# Patient Record
Sex: FEMALE | Race: WHITE | NOT HISPANIC OR LATINO | Employment: FULL TIME | URBAN - METROPOLITAN AREA
[De-identification: names, ages, dates, MRNs, and addresses within clinical notes are randomized per-mention and may not be internally consistent; named-entity substitution may affect disease eponyms.]

---

## 2019-07-17 PROBLEM — E28.2 PCOS (POLYCYSTIC OVARIAN SYNDROME): Status: ACTIVE | Noted: 2019-07-17

## 2019-07-17 PROBLEM — I10 HYPERTENSION: Status: ACTIVE | Noted: 2019-07-17

## 2019-07-17 PROBLEM — F32.81 PMDD (PREMENSTRUAL DYSPHORIC DISORDER): Status: ACTIVE | Noted: 2019-07-17

## 2021-03-06 ENCOUNTER — IMMUNIZATION (OUTPATIENT)
Dept: FAMILY MEDICINE | Facility: CLINIC | Age: 41
End: 2021-03-06
Payer: COMMERCIAL

## 2021-03-06 DIAGNOSIS — Z23 NEED FOR VACCINATION: Primary | ICD-10-CM

## 2021-03-06 PROCEDURE — 91300 COVID-19, MRNA, LNP-S, PF, 30 MCG/0.3 ML DOSE VACCINE: CPT | Mod: PBBFAC | Performed by: FAMILY MEDICINE

## 2021-03-27 ENCOUNTER — IMMUNIZATION (OUTPATIENT)
Dept: FAMILY MEDICINE | Facility: CLINIC | Age: 41
End: 2021-03-27
Payer: COMMERCIAL

## 2021-03-27 DIAGNOSIS — Z23 NEED FOR VACCINATION: Primary | ICD-10-CM

## 2021-03-27 PROCEDURE — 91300 COVID-19, MRNA, LNP-S, PF, 30 MCG/0.3 ML DOSE VACCINE: ICD-10-PCS | Mod: S$GLB,,, | Performed by: FAMILY MEDICINE

## 2021-03-27 PROCEDURE — 0002A COVID-19, MRNA, LNP-S, PF, 30 MCG/0.3 ML DOSE VACCINE: ICD-10-PCS | Mod: CV19,S$GLB,, | Performed by: FAMILY MEDICINE

## 2021-03-27 PROCEDURE — 0002A COVID-19, MRNA, LNP-S, PF, 30 MCG/0.3 ML DOSE VACCINE: CPT | Mod: CV19,S$GLB,, | Performed by: FAMILY MEDICINE

## 2021-03-27 PROCEDURE — 91300 COVID-19, MRNA, LNP-S, PF, 30 MCG/0.3 ML DOSE VACCINE: CPT | Mod: S$GLB,,, | Performed by: FAMILY MEDICINE

## 2021-06-17 PROBLEM — R93.89 ABNORMAL ULTRASOUND OF THYROID GLAND: Status: ACTIVE | Noted: 2021-06-17

## 2021-07-07 ENCOUNTER — OFFICE VISIT (OUTPATIENT)
Dept: ENDOCRINOLOGY | Facility: CLINIC | Age: 41
End: 2021-07-07
Payer: COMMERCIAL

## 2021-07-07 VITALS
DIASTOLIC BLOOD PRESSURE: 76 MMHG | HEART RATE: 91 BPM | HEIGHT: 63 IN | BODY MASS INDEX: 36.14 KG/M2 | SYSTOLIC BLOOD PRESSURE: 122 MMHG | WEIGHT: 203.94 LBS | OXYGEN SATURATION: 97 %

## 2021-07-07 DIAGNOSIS — E28.2 PCOS (POLYCYSTIC OVARIAN SYNDROME): ICD-10-CM

## 2021-07-07 DIAGNOSIS — I10 ESSENTIAL HYPERTENSION: ICD-10-CM

## 2021-07-07 DIAGNOSIS — E04.2 MULTINODULAR GOITER: Primary | ICD-10-CM

## 2021-07-07 DIAGNOSIS — E66.01 CLASS 2 SEVERE OBESITY DUE TO EXCESS CALORIES WITH SERIOUS COMORBIDITY AND BODY MASS INDEX (BMI) OF 36.0 TO 36.9 IN ADULT: ICD-10-CM

## 2021-07-07 PROCEDURE — 3008F BODY MASS INDEX DOCD: CPT | Mod: CPTII,S$GLB,, | Performed by: INTERNAL MEDICINE

## 2021-07-07 PROCEDURE — 3008F PR BODY MASS INDEX (BMI) DOCUMENTED: ICD-10-PCS | Mod: CPTII,S$GLB,, | Performed by: INTERNAL MEDICINE

## 2021-07-07 PROCEDURE — 99999 PR PBB SHADOW E&M-EST. PATIENT-LVL IV: ICD-10-PCS | Mod: PBBFAC,,, | Performed by: INTERNAL MEDICINE

## 2021-07-07 PROCEDURE — 1126F PR PAIN SEVERITY QUANTIFIED, NO PAIN PRESENT: ICD-10-PCS | Mod: S$GLB,,, | Performed by: INTERNAL MEDICINE

## 2021-07-07 PROCEDURE — 99204 OFFICE O/P NEW MOD 45 MIN: CPT | Mod: S$GLB,,, | Performed by: INTERNAL MEDICINE

## 2021-07-07 PROCEDURE — 99204 PR OFFICE/OUTPT VISIT, NEW, LEVL IV, 45-59 MIN: ICD-10-PCS | Mod: S$GLB,,, | Performed by: INTERNAL MEDICINE

## 2021-07-07 PROCEDURE — 99999 PR PBB SHADOW E&M-EST. PATIENT-LVL IV: CPT | Mod: PBBFAC,,, | Performed by: INTERNAL MEDICINE

## 2021-07-07 PROCEDURE — 1126F AMNT PAIN NOTED NONE PRSNT: CPT | Mod: S$GLB,,, | Performed by: INTERNAL MEDICINE

## 2021-07-29 ENCOUNTER — PATIENT MESSAGE (OUTPATIENT)
Dept: RHEUMATOLOGY | Facility: CLINIC | Age: 41
End: 2021-07-29

## 2021-08-02 ENCOUNTER — OFFICE VISIT (OUTPATIENT)
Dept: RHEUMATOLOGY | Facility: CLINIC | Age: 41
End: 2021-08-02
Payer: COMMERCIAL

## 2021-08-02 ENCOUNTER — HOSPITAL ENCOUNTER (OUTPATIENT)
Dept: RADIOLOGY | Facility: HOSPITAL | Age: 41
Discharge: HOME | End: 2021-08-02
Attending: INTERNAL MEDICINE
Payer: COMMERCIAL

## 2021-08-02 VITALS
WEIGHT: 208.13 LBS | DIASTOLIC BLOOD PRESSURE: 90 MMHG | HEART RATE: 98 BPM | HEIGHT: 63 IN | SYSTOLIC BLOOD PRESSURE: 129 MMHG | BODY MASS INDEX: 36.88 KG/M2

## 2021-08-02 DIAGNOSIS — M13.0 POLYARTHRITIS: Primary | ICD-10-CM

## 2021-08-02 DIAGNOSIS — M13.0 POLYARTHRITIS: ICD-10-CM

## 2021-08-02 PROCEDURE — 99999 PR PBB SHADOW E&M-EST. PATIENT-LVL IV: CPT | Mod: PBBFAC,,, | Performed by: INTERNAL MEDICINE

## 2021-08-02 PROCEDURE — 73630 X-RAY EXAM OF FOOT: CPT | Mod: 26,,, | Performed by: RADIOLOGY

## 2021-08-02 PROCEDURE — 1159F MED LIST DOCD IN RCRD: CPT | Mod: CPTII,S$GLB,, | Performed by: INTERNAL MEDICINE

## 2021-08-02 PROCEDURE — 3074F SYST BP LT 130 MM HG: CPT | Mod: CPTII,S$GLB,, | Performed by: INTERNAL MEDICINE

## 2021-08-02 PROCEDURE — 3074F PR MOST RECENT SYSTOLIC BLOOD PRESSURE < 130 MM HG: ICD-10-PCS | Mod: CPTII,S$GLB,, | Performed by: INTERNAL MEDICINE

## 2021-08-02 PROCEDURE — 73630 X-RAY EXAM OF FOOT: CPT | Mod: TC,50,FY,PO

## 2021-08-02 PROCEDURE — 3080F DIAST BP >= 90 MM HG: CPT | Mod: CPTII,S$GLB,, | Performed by: INTERNAL MEDICINE

## 2021-08-02 PROCEDURE — 3080F PR MOST RECENT DIASTOLIC BLOOD PRESSURE >= 90 MM HG: ICD-10-PCS | Mod: CPTII,S$GLB,, | Performed by: INTERNAL MEDICINE

## 2021-08-02 PROCEDURE — 73630 XR FOOT COMPLETE 3 VIEW BILATERAL: ICD-10-PCS | Mod: 26,,, | Performed by: RADIOLOGY

## 2021-08-02 PROCEDURE — 3008F BODY MASS INDEX DOCD: CPT | Mod: CPTII,S$GLB,, | Performed by: INTERNAL MEDICINE

## 2021-08-02 PROCEDURE — 3008F PR BODY MASS INDEX (BMI) DOCUMENTED: ICD-10-PCS | Mod: CPTII,S$GLB,, | Performed by: INTERNAL MEDICINE

## 2021-08-02 PROCEDURE — 99205 PR OFFICE/OUTPT VISIT, NEW, LEVL V, 60-74 MIN: ICD-10-PCS | Mod: S$GLB,,, | Performed by: INTERNAL MEDICINE

## 2021-08-02 PROCEDURE — 99999 PR PBB SHADOW E&M-EST. PATIENT-LVL IV: ICD-10-PCS | Mod: PBBFAC,,, | Performed by: INTERNAL MEDICINE

## 2021-08-02 PROCEDURE — 1160F RVW MEDS BY RX/DR IN RCRD: CPT | Mod: CPTII,S$GLB,, | Performed by: INTERNAL MEDICINE

## 2021-08-02 PROCEDURE — 99205 OFFICE O/P NEW HI 60 MIN: CPT | Mod: S$GLB,,, | Performed by: INTERNAL MEDICINE

## 2021-08-02 PROCEDURE — 3044F HG A1C LEVEL LT 7.0%: CPT | Mod: CPTII,S$GLB,, | Performed by: INTERNAL MEDICINE

## 2021-08-02 PROCEDURE — 3044F PR MOST RECENT HEMOGLOBIN A1C LEVEL <7.0%: ICD-10-PCS | Mod: CPTII,S$GLB,, | Performed by: INTERNAL MEDICINE

## 2021-08-02 PROCEDURE — 1159F PR MEDICATION LIST DOCUMENTED IN MEDICAL RECORD: ICD-10-PCS | Mod: CPTII,S$GLB,, | Performed by: INTERNAL MEDICINE

## 2021-08-02 PROCEDURE — 1160F PR REVIEW ALL MEDS BY PRESCRIBER/CLIN PHARMACIST DOCUMENTED: ICD-10-PCS | Mod: CPTII,S$GLB,, | Performed by: INTERNAL MEDICINE

## 2021-08-02 RX ORDER — COLCHICINE 0.6 MG/1
TABLET ORAL
COMMUNITY
Start: 2021-07-24 | End: 2023-01-25 | Stop reason: ALTCHOICE

## 2021-08-02 RX ORDER — INDOMETHACIN 50 MG/1
50 CAPSULE ORAL 3 TIMES DAILY PRN
COMMUNITY
Start: 2021-07-24 | End: 2023-01-25 | Stop reason: ALTCHOICE

## 2021-08-03 ENCOUNTER — TELEPHONE (OUTPATIENT)
Dept: RHEUMATOLOGY | Facility: CLINIC | Age: 41
End: 2021-08-03

## 2021-11-29 ENCOUNTER — TELEPHONE (OUTPATIENT)
Dept: RHEUMATOLOGY | Facility: CLINIC | Age: 41
End: 2021-11-29

## 2021-11-29 DIAGNOSIS — M13.0 POLYARTHRITIS: ICD-10-CM

## 2021-11-29 DIAGNOSIS — M77.51 ADHESIVE CAPSULITIS OF ANKLE, RIGHT: Primary | ICD-10-CM

## 2021-11-29 DIAGNOSIS — M75.01 ADHESIVE CAPSULITIS OF RIGHT SHOULDER: ICD-10-CM

## 2021-11-30 ENCOUNTER — OFFICE VISIT (OUTPATIENT)
Dept: RHEUMATOLOGY | Facility: CLINIC | Age: 41
End: 2021-11-30
Payer: COMMERCIAL

## 2021-11-30 VITALS
HEART RATE: 96 BPM | HEIGHT: 63 IN | WEIGHT: 210.75 LBS | SYSTOLIC BLOOD PRESSURE: 108 MMHG | BODY MASS INDEX: 37.34 KG/M2 | DIASTOLIC BLOOD PRESSURE: 78 MMHG

## 2021-11-30 DIAGNOSIS — M75.31 CALCIFIC TENDONITIS OF RIGHT SHOULDER: Primary | ICD-10-CM

## 2021-11-30 PROCEDURE — 3044F HG A1C LEVEL LT 7.0%: CPT | Mod: CPTII,S$GLB,, | Performed by: INTERNAL MEDICINE

## 2021-11-30 PROCEDURE — 99999 PR PBB SHADOW E&M-EST. PATIENT-LVL IV: ICD-10-PCS | Mod: PBBFAC,,, | Performed by: INTERNAL MEDICINE

## 2021-11-30 PROCEDURE — 3074F PR MOST RECENT SYSTOLIC BLOOD PRESSURE < 130 MM HG: ICD-10-PCS | Mod: CPTII,S$GLB,, | Performed by: INTERNAL MEDICINE

## 2021-11-30 PROCEDURE — 1159F PR MEDICATION LIST DOCUMENTED IN MEDICAL RECORD: ICD-10-PCS | Mod: CPTII,S$GLB,, | Performed by: INTERNAL MEDICINE

## 2021-11-30 PROCEDURE — 3008F BODY MASS INDEX DOCD: CPT | Mod: CPTII,S$GLB,, | Performed by: INTERNAL MEDICINE

## 2021-11-30 PROCEDURE — 3078F DIAST BP <80 MM HG: CPT | Mod: CPTII,S$GLB,, | Performed by: INTERNAL MEDICINE

## 2021-11-30 PROCEDURE — 3074F SYST BP LT 130 MM HG: CPT | Mod: CPTII,S$GLB,, | Performed by: INTERNAL MEDICINE

## 2021-11-30 PROCEDURE — 20610 LARGE JOINT ASPIRATION/INJECTION: R SUBACROMIAL BURSA: ICD-10-PCS | Mod: RT,S$GLB,, | Performed by: INTERNAL MEDICINE

## 2021-11-30 PROCEDURE — 3078F PR MOST RECENT DIASTOLIC BLOOD PRESSURE < 80 MM HG: ICD-10-PCS | Mod: CPTII,S$GLB,, | Performed by: INTERNAL MEDICINE

## 2021-11-30 PROCEDURE — 99999 PR PBB SHADOW E&M-EST. PATIENT-LVL IV: CPT | Mod: PBBFAC,,, | Performed by: INTERNAL MEDICINE

## 2021-11-30 PROCEDURE — 20610 DRAIN/INJ JOINT/BURSA W/O US: CPT | Mod: RT,S$GLB,, | Performed by: INTERNAL MEDICINE

## 2021-11-30 PROCEDURE — 3044F PR MOST RECENT HEMOGLOBIN A1C LEVEL <7.0%: ICD-10-PCS | Mod: CPTII,S$GLB,, | Performed by: INTERNAL MEDICINE

## 2021-11-30 PROCEDURE — 3008F PR BODY MASS INDEX (BMI) DOCUMENTED: ICD-10-PCS | Mod: CPTII,S$GLB,, | Performed by: INTERNAL MEDICINE

## 2021-11-30 PROCEDURE — 99213 OFFICE O/P EST LOW 20 MIN: CPT | Mod: 25,S$GLB,, | Performed by: INTERNAL MEDICINE

## 2021-11-30 PROCEDURE — 99213 PR OFFICE/OUTPT VISIT, EST, LEVL III, 20-29 MIN: ICD-10-PCS | Mod: 25,S$GLB,, | Performed by: INTERNAL MEDICINE

## 2021-11-30 PROCEDURE — 1159F MED LIST DOCD IN RCRD: CPT | Mod: CPTII,S$GLB,, | Performed by: INTERNAL MEDICINE

## 2021-11-30 RX ORDER — METHYLPREDNISOLONE 4 MG/1
TABLET ORAL
Qty: 1 EACH | Refills: 0 | Status: SHIPPED | OUTPATIENT
Start: 2021-11-30 | End: 2022-01-17

## 2021-11-30 RX ORDER — DICLOFENAC SODIUM 75 MG/1
75 TABLET, DELAYED RELEASE ORAL 2 TIMES DAILY
Qty: 60 TABLET | Refills: 0 | Status: SHIPPED | OUTPATIENT
Start: 2021-11-30 | End: 2021-12-30

## 2021-11-30 RX ADMIN — METHYLPREDNISOLONE ACETATE 40 MG: 40 INJECTION, SUSPENSION INTRA-ARTICULAR; INTRALESIONAL; INTRAMUSCULAR; SOFT TISSUE at 03:11

## 2021-11-30 ASSESSMENT — ROUTINE ASSESSMENT OF PATIENT INDEX DATA (RAPID3)
PSYCHOLOGICAL DISTRESS SCORE: 0
PATIENT GLOBAL ASSESSMENT SCORE: 5
PAIN SCORE: 8
TOTAL RAPID3 SCORE: 6.22
FATIGUE SCORE: 2.2
MDHAQ FUNCTION SCORE: 1.7

## 2021-12-31 RX ORDER — METHYLPREDNISOLONE ACETATE 40 MG/ML
40 INJECTION, SUSPENSION INTRA-ARTICULAR; INTRALESIONAL; INTRAMUSCULAR; SOFT TISSUE
Status: DISCONTINUED | OUTPATIENT
Start: 2021-11-30 | End: 2021-12-31 | Stop reason: HOSPADM

## 2022-03-10 ENCOUNTER — PATIENT MESSAGE (OUTPATIENT)
Dept: RHEUMATOLOGY | Facility: CLINIC | Age: 42
End: 2022-03-10
Payer: COMMERCIAL

## 2022-03-31 ENCOUNTER — OFFICE VISIT (OUTPATIENT)
Dept: RHEUMATOLOGY | Facility: CLINIC | Age: 42
End: 2022-03-31
Payer: COMMERCIAL

## 2022-03-31 VITALS
DIASTOLIC BLOOD PRESSURE: 83 MMHG | WEIGHT: 207.31 LBS | HEART RATE: 90 BPM | HEIGHT: 63 IN | BODY MASS INDEX: 36.73 KG/M2 | SYSTOLIC BLOOD PRESSURE: 116 MMHG

## 2022-03-31 DIAGNOSIS — M15.3 OTHER SECONDARY OSTEOARTHRITIS OF MULTIPLE SITES: Primary | ICD-10-CM

## 2022-03-31 PROCEDURE — 99213 PR OFFICE/OUTPT VISIT, EST, LEVL III, 20-29 MIN: ICD-10-PCS | Mod: S$GLB,,, | Performed by: INTERNAL MEDICINE

## 2022-03-31 PROCEDURE — 99999 PR PBB SHADOW E&M-EST. PATIENT-LVL III: ICD-10-PCS | Mod: PBBFAC,,, | Performed by: INTERNAL MEDICINE

## 2022-03-31 PROCEDURE — 3008F PR BODY MASS INDEX (BMI) DOCUMENTED: ICD-10-PCS | Mod: CPTII,S$GLB,, | Performed by: INTERNAL MEDICINE

## 2022-03-31 PROCEDURE — 1159F MED LIST DOCD IN RCRD: CPT | Mod: CPTII,S$GLB,, | Performed by: INTERNAL MEDICINE

## 2022-03-31 PROCEDURE — 99213 OFFICE O/P EST LOW 20 MIN: CPT | Mod: S$GLB,,, | Performed by: INTERNAL MEDICINE

## 2022-03-31 PROCEDURE — 3008F BODY MASS INDEX DOCD: CPT | Mod: CPTII,S$GLB,, | Performed by: INTERNAL MEDICINE

## 2022-03-31 PROCEDURE — 1159F PR MEDICATION LIST DOCUMENTED IN MEDICAL RECORD: ICD-10-PCS | Mod: CPTII,S$GLB,, | Performed by: INTERNAL MEDICINE

## 2022-03-31 PROCEDURE — 99999 PR PBB SHADOW E&M-EST. PATIENT-LVL III: CPT | Mod: PBBFAC,,, | Performed by: INTERNAL MEDICINE

## 2022-03-31 PROCEDURE — 3079F DIAST BP 80-89 MM HG: CPT | Mod: CPTII,S$GLB,, | Performed by: INTERNAL MEDICINE

## 2022-03-31 PROCEDURE — 3079F PR MOST RECENT DIASTOLIC BLOOD PRESSURE 80-89 MM HG: ICD-10-PCS | Mod: CPTII,S$GLB,, | Performed by: INTERNAL MEDICINE

## 2022-03-31 PROCEDURE — 3074F PR MOST RECENT SYSTOLIC BLOOD PRESSURE < 130 MM HG: ICD-10-PCS | Mod: CPTII,S$GLB,, | Performed by: INTERNAL MEDICINE

## 2022-03-31 PROCEDURE — 3074F SYST BP LT 130 MM HG: CPT | Mod: CPTII,S$GLB,, | Performed by: INTERNAL MEDICINE

## 2022-03-31 NOTE — PROGRESS NOTES
Answers for HPI/ROS submitted by the patient on 3/28/2022  fever: No  eye redness: No  mouth sores: Yes  headaches: Yes  shortness of breath: No  chest pain: No  trouble swallowing: Yes  diarrhea: No  constipation: No  unexpected weight change: No  genital sore: No  dysuria: No  During the last 3 days, have you had a skin rash?: No  Bruises or bleeds easily: No  cough: No      Subjective:          Chief Complaint: Yumiko Montes is a 41 y.o. female who had concerns including Disease Management.    HPI:  Interval events:  Received a call from patient acute onset 24 hours ago right shoulder significant stiffness loss of range of motion no known trauma she had been on vacation during Thanksgiving without any heavy lifting or heavy exertion.    Imaging showed calcific tendon changes with  preserved glenohumeral joint some mild AC joint arthropathy.   Patient tried nabumetone with no benefit start her on Indocin 50 mg at 3:00 p.m. and again at bedtime last night with Tylenol 500 mg each dosing.    Rheum Hx:   Patient is a 40-year-old female with acute onset of possible gouty arthritis with a known strong family history of rheumatoid arthritis both in her mother and her paternal grandmother.  Patient with acute onset right knee effusion 2 weeks ago, received injection with Radu. 2-3 days later with acute onset intense right ankle/foot swelling. Would not tolerate weight bearing.   Seen in urgent care given colchicine and indomethacin she did somewhat improve over few days.   Patient is on HCTZ and spironolactone, but also on has mirena and or ortho tri-cyclin for PCOS and stable.   No hx of renal stones.     Did see JAYLA Campos-- uric acid 5.7t.   Patient denies daily arthritis. Did have flare of left wrist w/o injury spontaneously resolved 1 week with   he did have  right hip labral tear- known injury.   Patient does note knees and feet with stiffness in AM about 1 hour and improves with activity.   Hands with  extended  Fine motor.     Dry mouth,     REVIEW OF SYSTEMS:    Review of Systems   Constitutional: Negative for fever.   Eyes: Negative for redness.   Respiratory: Negative for cough and shortness of breath.    Cardiovascular: Negative for chest pain.   Gastrointestinal: Positive for diarrhea. Negative for constipation.   Genitourinary: Negative for dysuria.   Skin: Negative for rash.   Neurological: Negative for headaches.   Endo/Heme/Allergies: Does not bruise/bleed easily.               Objective:            Past Medical History:   Diagnosis Date    Abnormal ultrasound of thyroid gland 06/17/2021    Referred to Dr Banegas. Recommended Follow up in 6/2022     History of chicken pox     PCOS (polycystic ovarian syndrome)      Family History   Problem Relation Age of Onset    Other Mother         heart murmur, gastric sleeve surgery     Hypertension Father     Thyroid disease Father         has had thyroidectomy     Dupuytren's contracture Father     Heart disease Maternal Uncle     Hypertension Maternal Grandmother     Glaucoma Maternal Grandmother     Cancer Maternal Grandfather     Heart disease Maternal Grandfather     Heart disease Paternal Grandfather     Heart disease Maternal Uncle      Social History     Tobacco Use    Smoking status: Never Smoker    Smokeless tobacco: Never Used   Substance Use Topics    Alcohol use: Yes     Comment: 2-3 times a week     Drug use: Never         Current Outpatient Medications on File Prior to Visit   Medication Sig Dispense Refill    buPROPion (WELLBUTRIN XL) 150 MG TB24 tablet Take 1 tablet by mouth once daily.      colchicine (COLCRYS) 0.6 mg tablet Take by mouth. Take 2 tablets at first sign of gout attack and every hour prn      indomethacin (INDOCIN) 50 MG capsule Take 50 mg by mouth 3 (three) times daily as needed.      INTRAUTERINE DEVICE, IUD, IU by Intrauterine route.      losartan-hydrochlorothiazide 100-25 mg (HYZAAR) 100-25 mg per tablet  TAKE ONE TABLET BY MOUTH DAILY 90 tablet 1    metFORMIN (GLUCOPHAGE) 500 MG tablet Take 1 tablet (500 mg total) by mouth 2 (two) times daily with meals. 180 tablet 3    norgestimate-ethinyl estradiol (ORTHO TRI-CYCLEN,TRI-SPRINTEC) 0.18/0.215/0.25 mg-35 mcg (28) tablet Take 1 tablet by mouth once daily.      omeprazole (PRILOSEC) 20 MG capsule Take 20 mg by mouth once daily.      spironolactone (ALDACTONE) 100 MG tablet Take 1 tablet by mouth once daily.       No current facility-administered medications on file prior to visit.       Vitals:    03/31/22 1532   BP: 116/83   Pulse: 90       Physical Exam:    Physical Exam          Assessment:       No diagnosis found.       Plan:        There are no diagnoses linked to this encounter.patient with imaging and pain consistent with acute flare calcific tendonitis:    Right shoulder injection today   PT if not improved after injection   voltaren and MEdrol as above.   No follow-ups on file.      20min consultation with greater than 50% spent in counseling, chart review and coordination of care. All questions answered.  Thank you for allowing me to participate in the care of this very pleasant patient.            Final draft pending.

## 2022-07-07 ENCOUNTER — HOSPITAL ENCOUNTER (OUTPATIENT)
Dept: RADIOLOGY | Facility: HOSPITAL | Age: 42
Discharge: HOME | End: 2022-07-07
Attending: INTERNAL MEDICINE
Payer: COMMERCIAL

## 2022-07-07 DIAGNOSIS — E04.2 MULTINODULAR GOITER: ICD-10-CM

## 2022-07-07 PROCEDURE — 76536 US EXAM OF HEAD AND NECK: CPT | Mod: 26,,, | Performed by: RADIOLOGY

## 2022-07-07 PROCEDURE — 76536 US SOFT TISSUE HEAD NECK THYROID: ICD-10-PCS | Mod: 26,,, | Performed by: RADIOLOGY

## 2022-07-07 PROCEDURE — 76536 US EXAM OF HEAD AND NECK: CPT | Mod: TC,PO

## 2022-07-20 ENCOUNTER — TELEPHONE (OUTPATIENT)
Dept: ENDOCRINOLOGY | Facility: CLINIC | Age: 42
End: 2022-07-20
Payer: COMMERCIAL

## 2022-07-20 DIAGNOSIS — E04.2 MULTINODULAR GOITER: Primary | ICD-10-CM

## 2022-07-20 NOTE — TELEPHONE ENCOUNTER
Reviewed thyroid US. No concerning new findings. Repeat US in 1 year. RTC in 1 year with any full thyroid provider

## 2022-10-03 ENCOUNTER — TELEPHONE (OUTPATIENT)
Dept: PODIATRY | Facility: CLINIC | Age: 42
End: 2022-10-03

## 2022-10-03 ENCOUNTER — PATIENT MESSAGE (OUTPATIENT)
Dept: PODIATRY | Facility: CLINIC | Age: 42
End: 2022-10-03

## 2022-10-03 ENCOUNTER — OFFICE VISIT (OUTPATIENT)
Dept: PODIATRY | Facility: CLINIC | Age: 42
End: 2022-10-03
Payer: COMMERCIAL

## 2022-10-03 DIAGNOSIS — M62.462 GASTROCNEMIUS EQUINUS OF LEFT LOWER EXTREMITY: ICD-10-CM

## 2022-10-03 DIAGNOSIS — M72.2 PLANTAR FASCIITIS: Primary | ICD-10-CM

## 2022-10-03 PROCEDURE — 1159F PR MEDICATION LIST DOCUMENTED IN MEDICAL RECORD: ICD-10-PCS | Mod: CPTII,S$GLB,, | Performed by: PODIATRIST

## 2022-10-03 PROCEDURE — 99204 PR OFFICE/OUTPT VISIT, NEW, LEVL IV, 45-59 MIN: ICD-10-PCS | Mod: S$GLB,,, | Performed by: PODIATRIST

## 2022-10-03 PROCEDURE — 3044F PR MOST RECENT HEMOGLOBIN A1C LEVEL <7.0%: ICD-10-PCS | Mod: CPTII,S$GLB,, | Performed by: PODIATRIST

## 2022-10-03 PROCEDURE — 99999 PR PBB SHADOW E&M-EST. PATIENT-LVL III: ICD-10-PCS | Mod: PBBFAC,,, | Performed by: PODIATRIST

## 2022-10-03 PROCEDURE — 99999 PR PBB SHADOW E&M-EST. PATIENT-LVL III: CPT | Mod: PBBFAC,,, | Performed by: PODIATRIST

## 2022-10-03 PROCEDURE — 3044F HG A1C LEVEL LT 7.0%: CPT | Mod: CPTII,S$GLB,, | Performed by: PODIATRIST

## 2022-10-03 PROCEDURE — 1159F MED LIST DOCD IN RCRD: CPT | Mod: CPTII,S$GLB,, | Performed by: PODIATRIST

## 2022-10-03 PROCEDURE — 99204 OFFICE O/P NEW MOD 45 MIN: CPT | Mod: S$GLB,,, | Performed by: PODIATRIST

## 2022-10-03 RX ORDER — METHYLPREDNISOLONE 4 MG/1
TABLET ORAL
Qty: 1 EACH | Refills: 0 | Status: SHIPPED | OUTPATIENT
Start: 2022-10-03 | End: 2023-01-25

## 2022-10-03 NOTE — PROGRESS NOTES
Subjective:      Patient ID: Yumiko Montes is a 41 y.o. female.    Chief Complaint: Foot Pain (Lt. Foot pain)    Yumiko is a 41 y.o. female with a past medical history of Abnormal ultrasound of thyroid gland (2021), History of chicken pox, and PCOS (polycystic ovarian syndrome). Patient relates Lt. Heel pain that has been present for months.  Describes pain as sharp and radiates from the proximal medial arch to the posterior heel.  Rates pain as a 5/10.  Symptoms are aggravated with all weight bearing, especially with initial weight bearing steps.  Symptoms are partially alleviated with rest. Denies recent trauma to the affected limb.  Inquires as to treatment options to resolve this issue.  Denies any additional pedal complaints.      Past Medical History:   Diagnosis Date    Abnormal ultrasound of thyroid gland 2021    Referred to Dr Banegas. Recommended Follow up in 2022     History of chicken pox     PCOS (polycystic ovarian syndrome)        Past Surgical History:   Procedure Laterality Date     SECTION         Family History   Problem Relation Age of Onset    Other Mother         heart murmur, gastric sleeve surgery     Hypertension Father     Thyroid disease Father         has had thyroidectomy     Dupuytren's contracture Father     Heart disease Maternal Uncle     Hypertension Maternal Grandmother     Glaucoma Maternal Grandmother     Cancer Maternal Grandfather     Heart disease Maternal Grandfather     Heart disease Paternal Grandfather     Heart disease Maternal Uncle        Social History     Socioeconomic History    Marital status:    Tobacco Use    Smoking status: Never    Smokeless tobacco: Never   Substance and Sexual Activity    Alcohol use: Yes     Comment: 2-3 times a week     Drug use: Never    Sexual activity: Yes       Current Outpatient Medications   Medication Sig Dispense Refill    buPROPion (WELLBUTRIN XL) 150 MG TB24 tablet Take 1 tablet by mouth once daily.       colchicine (COLCRYS) 0.6 mg tablet Take by mouth. Take 2 tablets at first sign of gout attack and every hour prn      indomethacin (INDOCIN) 50 MG capsule Take 50 mg by mouth 3 (three) times daily as needed.      INTRAUTERINE DEVICE, IUD, IU by Intrauterine route.      losartan-hydrochlorothiazide 100-25 mg (HYZAAR) 100-25 mg per tablet Take 1 tablet by mouth once daily. 90 tablet 3    norgestimate-ethinyl estradiol (ORTHO TRI-CYCLEN,TRI-SPRINTEC) 0.18/0.215/0.25 mg-35 mcg (28) tablet Take 1 tablet by mouth once daily.      omeprazole (PRILOSEC) 20 MG capsule Take 20 mg by mouth once daily.      semaglutide (OZEMPIC) 0.25 mg or 0.5 mg(2 mg/1.5 mL) pen injector       spironolactone (ALDACTONE) 100 MG tablet Take 1 tablet by mouth once daily.       No current facility-administered medications for this visit.       Review of patient's allergies indicates:   Allergen Reactions    Byetta [exenatide] Hives    Cephalosporins Anaphylaxis    Pcn [penicillins] Other (See Comments)     Respiratory distress     Sulfa (sulfonamide antibiotics) Rash        Hemoglobin A1C   Date Value Ref Range Status   07/26/2022 5.7 (H) 0.0 - 5.6 % Final     Comment:     Reference Interval:  5.0 - 5.6 Normal   5.7 - 6.4 High Risk   > 6.5 Diabetic      Hgb A1c results are standardized based on the (NGSP) National   Glycohemoglobin Standardization Program.      Hemoglobin A1C levels are related to mean serum/plasma glucose   during the preceding 2-3 months.        06/15/2021 5.6 0.0 - 5.6 % Final     Comment:     Reference Interval:  5.0 - 5.6 Normal   5.7 - 6.4 High Risk   > 6.5 Diabetic      Hgb A1c results are standardized based on the (NGSP) National   Glycohemoglobin Standardization Program.      Hemoglobin A1C levels are related to mean serum/plasma glucose   during the preceding 2-3 months.              Review of Systems   Constitutional: Negative for chills and fever.   Cardiovascular:  Negative for claudication and leg swelling.    Skin:  Negative for color change and nail changes.   Musculoskeletal:  Positive for myalgias. Negative for joint pain, joint swelling, muscle cramps and muscle weakness.   Gastrointestinal:  Negative for nausea and vomiting.   Neurological:  Negative for numbness and paresthesias.   Psychiatric/Behavioral:  Negative for altered mental status.          Objective:      Physical Exam  Constitutional:       Appearance: Normal appearance. She is not ill-appearing.   Cardiovascular:      Pulses:           Dorsalis pedis pulses are 2+ on the right side and 2+ on the left side.        Posterior tibial pulses are 2+ on the right side and 2+ on the left side.      Comments: CFT is < 3 seconds bilateral.  Pedal hair growth is present bilateral.  No lower extremity edema noted bilateral.  Toes are warm to touch bilateral.    Musculoskeletal:         General: Tenderness present. No signs of injury.      Right lower leg: No edema.      Left lower leg: No edema.      Comments: Muscle strength 5/5 in all muscle groups bilateral.  No tenderness nor crepitation with ROM of foot/ankle joints bilateral.  Pain with palpation to medial calcaneal tubercle as well as along the proximal distribution of the medial band of plantar fascia of the Lt. Foot.  Lt. Sided gastrocnemius equinus.   Skin:     General: Skin is warm and dry.      Capillary Refill: Capillary refill takes 2 to 3 seconds.      Findings: No bruising, ecchymosis, erythema, signs of injury, laceration, lesion, petechiae, rash or wound.      Comments: Pedal skin has normal turgor, temperature, and texture bilateral.  Toenails x 10 appear normotrophic. Examination of the skin reveals no evidence of significant maceration, rashes, open lesions, suspicious appearing nevi or other concerning lesions.       Neurological:      General: No focal deficit present.      Mental Status: She is alert.      Sensory: No sensory deficit.      Motor: No weakness or atrophy.      Comments:  Light touch is intact bilateral.               Assessment:       Encounter Diagnoses   Name Primary?    Plantar fasciitis Yes    Gastrocnemius equinus of left lower extremity          Plan:       Yumiko was seen today for foot pain.    Diagnoses and all orders for this visit:    Plantar fasciitis    Gastrocnemius equinus of left lower extremity    I counseled the patient on her conditions, their implications and medical management.    - Fitted and dispensed a postoperative boot to be worn on the Lt. Side daily for the next two weeks.  Patient to then transition into supportive shoe gear in conjunction with performing the recommended stretching exercises.    - Discussed performing stretching exercises to address bilateral equinus.     - Recommend wearing supportive shoes only.  Discussed avoidance of barefoot walking, flip flops, and Crocs, as this will exacerbate current symptoms.       - Recommend icing the affected heel a minimum of 20 minutes daily.     - Recommend taking a nsaid to address pain/inflammation.    - Discussed avoidance of high impact activities such as squatting, stooping, and running as these activities will exacerbate symptoms.       - May consider applying a topical analgesic (Voltaren cream) to help with pain symptoms.       - RTC prn or sooner if symptoms fail to resolve within 6 weeks.  Will consider corticosteroid injection and/or PT at that time.     Mauricio Melchor DPM

## 2022-10-03 NOTE — PATIENT INSTRUCTIONS
- Perform stretching exercises, see handout for details, to address tightness of calf muscles.      - Recommend wearing supportive shoes only.  Avoid barefoot walking, flip flops, and Crocs, as this will exacerbate current symptoms.      - Shoes: Mya and Jeniffer    - Recommend wearing a pair of OTC insoles.  Given both verbal and written information regarding this.    - Recommend icing the affected heel a minimum of 20 minutes daily.    - Avoid high impact activities such as squatting, stooping, and running as these activities will exacerbate symptoms.      - May consider applying a topical analgesic (Voltaren cream) to help with pain symptoms.

## 2022-10-03 NOTE — TELEPHONE ENCOUNTER
----- Message from Katiana Seymour sent at 10/3/2022  4:13 PM CDT -----  Who Called: Patient    What is the reqeust in detail: Requesting call back to steroid pack sent in as Dr. Melchor informed patient that he would at her office visit this morning. Please advise.     Can the clinic reply by [facility]? No    Best Call Back Number: [phone]    Additional Information:

## 2022-10-28 ENCOUNTER — OFFICE VISIT (OUTPATIENT)
Dept: PODIATRY | Facility: CLINIC | Age: 42
End: 2022-10-28
Payer: COMMERCIAL

## 2022-10-28 VITALS — WEIGHT: 204.13 LBS | HEIGHT: 63 IN | BODY MASS INDEX: 36.17 KG/M2

## 2022-10-28 DIAGNOSIS — M72.2 PLANTAR FASCIITIS: Primary | ICD-10-CM

## 2022-10-28 DIAGNOSIS — M62.462 GASTROCNEMIUS EQUINUS OF LEFT LOWER EXTREMITY: ICD-10-CM

## 2022-10-28 PROCEDURE — 1159F MED LIST DOCD IN RCRD: CPT | Mod: CPTII,S$GLB,, | Performed by: PODIATRIST

## 2022-10-28 PROCEDURE — 3044F HG A1C LEVEL LT 7.0%: CPT | Mod: CPTII,S$GLB,, | Performed by: PODIATRIST

## 2022-10-28 PROCEDURE — 1159F PR MEDICATION LIST DOCUMENTED IN MEDICAL RECORD: ICD-10-PCS | Mod: CPTII,S$GLB,, | Performed by: PODIATRIST

## 2022-10-28 PROCEDURE — 20550 PR INJECT TENDON SHEATH/LIGAMENT: ICD-10-PCS | Mod: LT,S$GLB,, | Performed by: PODIATRIST

## 2022-10-28 PROCEDURE — 20550 NJX 1 TENDON SHEATH/LIGAMENT: CPT | Mod: LT,S$GLB,, | Performed by: PODIATRIST

## 2022-10-28 PROCEDURE — 99999 PR PBB SHADOW E&M-EST. PATIENT-LVL II: CPT | Mod: PBBFAC,,, | Performed by: PODIATRIST

## 2022-10-28 PROCEDURE — 99999 PR PBB SHADOW E&M-EST. PATIENT-LVL II: ICD-10-PCS | Mod: PBBFAC,,, | Performed by: PODIATRIST

## 2022-10-28 PROCEDURE — 3044F PR MOST RECENT HEMOGLOBIN A1C LEVEL <7.0%: ICD-10-PCS | Mod: CPTII,S$GLB,, | Performed by: PODIATRIST

## 2022-10-28 PROCEDURE — 99499 NO LOS: ICD-10-PCS | Mod: S$GLB,,, | Performed by: PODIATRIST

## 2022-10-28 PROCEDURE — 99499 UNLISTED E&M SERVICE: CPT | Mod: S$GLB,,, | Performed by: PODIATRIST

## 2022-10-28 RX ORDER — TRIAMCINOLONE ACETONIDE 40 MG/ML
20 INJECTION, SUSPENSION INTRA-ARTICULAR; INTRAMUSCULAR ONCE
Status: COMPLETED | OUTPATIENT
Start: 2022-10-28 | End: 2022-10-28

## 2022-10-28 RX ORDER — LIDOCAINE HYDROCHLORIDE 10 MG/ML
1 INJECTION INFILTRATION; PERINEURAL
Status: COMPLETED | OUTPATIENT
Start: 2022-10-28 | End: 2022-10-28

## 2022-10-28 RX ORDER — DEXAMETHASONE SODIUM PHOSPHATE 4 MG/ML
2 INJECTION, SOLUTION INTRA-ARTICULAR; INTRALESIONAL; INTRAMUSCULAR; INTRAVENOUS; SOFT TISSUE
Status: COMPLETED | OUTPATIENT
Start: 2022-10-28 | End: 2022-10-28

## 2022-10-28 RX ADMIN — DEXAMETHASONE SODIUM PHOSPHATE 2 MG: 4 INJECTION, SOLUTION INTRA-ARTICULAR; INTRALESIONAL; INTRAMUSCULAR; INTRAVENOUS; SOFT TISSUE at 08:10

## 2022-10-28 RX ADMIN — LIDOCAINE HYDROCHLORIDE 1 ML: 10 INJECTION INFILTRATION; PERINEURAL at 08:10

## 2022-10-28 RX ADMIN — TRIAMCINOLONE ACETONIDE 20 MG: 40 INJECTION, SUSPENSION INTRA-ARTICULAR; INTRAMUSCULAR at 08:10

## 2022-10-28 NOTE — PROGRESS NOTES
Subjective:      Patient ID: Yumiko Montes is a 42 y.o. female.    Chief Complaint: Heel Pain (Left heel burning)    Patient presents to clinic for a three week follow up for Lt. Sided plantar fasciitis.  Notes symptoms have improved along the midfoot with use of the boot and performing stretching exercises.  She still continues to note discomfort along the medial heel.  Rates pain as a 0/10 while at rest.  Symptoms continue to be aggravated with weight bearing.  Notes adamantly stretching and continues to offload with the postoperative boot.   Denies any additional pedal complaints.      Past Medical History:   Diagnosis Date    Abnormal ultrasound of thyroid gland 2021    Referred to Dr Banegas. Recommended Follow up in 2022     History of chicken pox     PCOS (polycystic ovarian syndrome)        Past Surgical History:   Procedure Laterality Date     SECTION         Family History   Problem Relation Age of Onset    Other Mother         heart murmur, gastric sleeve surgery     Hypertension Father     Thyroid disease Father         has had thyroidectomy     Dupuytren's contracture Father     Heart disease Maternal Uncle     Hypertension Maternal Grandmother     Glaucoma Maternal Grandmother     Cancer Maternal Grandfather     Heart disease Maternal Grandfather     Heart disease Paternal Grandfather     Heart disease Maternal Uncle        Social History     Socioeconomic History    Marital status:    Tobacco Use    Smoking status: Never    Smokeless tobacco: Never   Substance and Sexual Activity    Alcohol use: Yes     Comment: 2-3 times a week     Drug use: Never    Sexual activity: Yes       Current Outpatient Medications   Medication Sig Dispense Refill    buPROPion (WELLBUTRIN XL) 150 MG TB24 tablet Take 1 tablet by mouth once daily.      colchicine (COLCRYS) 0.6 mg tablet Take by mouth. Take 2 tablets at first sign of gout attack and every hour prn      indomethacin (INDOCIN) 50 MG  capsule Take 50 mg by mouth 3 (three) times daily as needed.      INTRAUTERINE DEVICE, IUD, IU by Intrauterine route.      losartan-hydrochlorothiazide 100-25 mg (HYZAAR) 100-25 mg per tablet Take 1 tablet by mouth once daily. 90 tablet 3    methylPREDNISolone (MEDROL DOSEPACK) 4 mg tablet use as directed 1 each 0    norgestimate-ethinyl estradiol (ORTHO TRI-CYCLEN,TRI-SPRINTEC) 0.18/0.215/0.25 mg-35 mcg (28) tablet Take 1 tablet by mouth once daily.      omeprazole (PRILOSEC) 20 MG capsule Take 20 mg by mouth once daily.      semaglutide (OZEMPIC) 0.25 mg or 0.5 mg(2 mg/1.5 mL) pen injector       spironolactone (ALDACTONE) 100 MG tablet Take 1 tablet by mouth once daily.       No current facility-administered medications for this visit.       Review of patient's allergies indicates:   Allergen Reactions    Byetta [exenatide] Hives    Cephalosporins Anaphylaxis    Pcn [penicillins] Other (See Comments)     Respiratory distress     Sulfa (sulfonamide antibiotics) Rash        Hemoglobin A1C   Date Value Ref Range Status   07/26/2022 5.7 (H) 0.0 - 5.6 % Final     Comment:     Reference Interval:  5.0 - 5.6 Normal   5.7 - 6.4 High Risk   > 6.5 Diabetic      Hgb A1c results are standardized based on the (NGSP) National   Glycohemoglobin Standardization Program.      Hemoglobin A1C levels are related to mean serum/plasma glucose   during the preceding 2-3 months.        06/15/2021 5.6 0.0 - 5.6 % Final     Comment:     Reference Interval:  5.0 - 5.6 Normal   5.7 - 6.4 High Risk   > 6.5 Diabetic      Hgb A1c results are standardized based on the (NGSP) National   Glycohemoglobin Standardization Program.      Hemoglobin A1C levels are related to mean serum/plasma glucose   during the preceding 2-3 months.              Review of Systems   Constitutional: Negative for chills and fever.   Cardiovascular:  Negative for claudication and leg swelling.   Skin:  Negative for color change and nail changes.   Musculoskeletal:   Positive for myalgias. Negative for joint pain, joint swelling, muscle cramps and muscle weakness.   Gastrointestinal:  Negative for nausea and vomiting.   Neurological:  Negative for numbness and paresthesias.   Psychiatric/Behavioral:  Negative for altered mental status.          Objective:      Physical Exam  Constitutional:       Appearance: Normal appearance. She is not ill-appearing.   Cardiovascular:      Pulses:           Dorsalis pedis pulses are 2+ on the right side and 2+ on the left side.        Posterior tibial pulses are 2+ on the right side and 2+ on the left side.      Comments: CFT is < 3 seconds bilateral.  Pedal hair growth is present bilateral.  No lower extremity edema noted bilateral.  Toes are warm to touch bilateral.    Musculoskeletal:         General: Tenderness present. No signs of injury.      Right lower leg: No edema.      Left lower leg: No edema.      Comments: Muscle strength 5/5 in all muscle groups bilateral.  No tenderness nor crepitation with ROM of foot/ankle joints bilateral.  Pain with palpation to only the Lt. medial calcaneal tubercle.    Lt. Sided gastrocnemius equinus.   Skin:     General: Skin is warm and dry.      Capillary Refill: Capillary refill takes 2 to 3 seconds.      Findings: No bruising, ecchymosis, erythema, signs of injury, laceration, lesion, petechiae, rash or wound.      Comments: Pedal skin has normal turgor, temperature, and texture bilateral.  Toenails x 10 appear normotrophic. Examination of the skin reveals no evidence of significant maceration, rashes, open lesions, suspicious appearing nevi or other concerning lesions.       Neurological:      General: No focal deficit present.      Mental Status: She is alert.      Sensory: No sensory deficit.      Motor: No weakness or atrophy.      Comments: Light touch is intact bilateral.               Assessment:       Encounter Diagnoses   Name Primary?    Plantar fasciitis Yes    Gastrocnemius equinus of  left lower extremity          Plan:       Yumiko was seen today for heel pain.    Diagnoses and all orders for this visit:    Plantar fasciitis  -     Ambulatory referral/consult to Physical/Occupational Therapy; Future  -     LIDOcaine HCL 10 mg/ml (1%) injection 1 mL  -     dexAMETHasone injection 2 mg  -     triamcinolone acetonide injection 20 mg    Gastrocnemius equinus of left lower extremity  -     Ambulatory referral/consult to Physical/Occupational Therapy; Future    I counseled the patient on her conditions, their implications and medical management.    - After sterilizing the area with an alcohol prep and applying ethyl chloride, the affected area of the Lt. Medial heel was injected as per MAR.  The patient tolerated the injection well, with minimal blood loss noted from the injection site.  The injection site was then covered with a bandage.  Patient tolerated this quite well.        - Patient to continue performing the recommended stretching exercises.     - Recommend wearing supportive shoes only.  Discussed avoidance of barefoot walking, flip flops, and Crocs, as this will exacerbate current symptoms.       - Recommend icing the affected heel a minimum of 20 minutes daily.    - Discussed avoidance of high impact activities such as squatting, stooping, and running as these activities will exacerbate symptoms.       - May consider applying a topical analgesic (Voltaren cream) to help with pain symptoms.       - Orders written for PT for astym and dry needling.    - RTC prn or sooner if symptoms fail to resolve within 6 weeks.      Mauricio Melchor DPM

## 2022-11-02 ENCOUNTER — TELEPHONE (OUTPATIENT)
Dept: PODIATRY | Facility: CLINIC | Age: 42
End: 2022-11-02
Payer: COMMERCIAL

## 2022-11-02 NOTE — TELEPHONE ENCOUNTER
----- Message from Marsha Connor sent at 11/2/2022 10:38 AM CDT -----  Contact: Helen Torrez  Type: Needs Medical Advice    Who Called:  Helen Torrez  Symptoms (please be specific):  PT referral    Best Call Back Number: [phone] (patient)  Additional Information: The caller stated that the pt would like for the office to fax over a referral to the office for physical therapy. Please call caller back. Thanks.        Nevaeh Torrez  Fax# [phone]  Phone # [phone]

## 2023-01-12 ENCOUNTER — PATIENT MESSAGE (OUTPATIENT)
Dept: RHEUMATOLOGY | Facility: CLINIC | Age: 43
End: 2023-01-12
Payer: COMMERCIAL

## 2023-01-12 DIAGNOSIS — M13.0 POLYARTHRITIS: Primary | ICD-10-CM

## 2023-01-17 ENCOUNTER — PATIENT MESSAGE (OUTPATIENT)
Dept: RHEUMATOLOGY | Facility: CLINIC | Age: 43
End: 2023-01-17
Payer: COMMERCIAL

## 2023-01-17 ENCOUNTER — HOSPITAL ENCOUNTER (OUTPATIENT)
Dept: RADIOLOGY | Facility: HOSPITAL | Age: 43
Discharge: HOME | End: 2023-01-17
Attending: INTERNAL MEDICINE
Payer: COMMERCIAL

## 2023-01-17 DIAGNOSIS — M13.0 POLYARTHRITIS: ICD-10-CM

## 2023-01-17 PROCEDURE — 73630 XR FOOT COMPLETE 3 VIEW BILATERAL: ICD-10-PCS | Mod: 26,,, | Performed by: RADIOLOGY

## 2023-01-17 PROCEDURE — 73630 X-RAY EXAM OF FOOT: CPT | Mod: TC,50,FY,PO

## 2023-01-17 PROCEDURE — 73630 X-RAY EXAM OF FOOT: CPT | Mod: 26,,, | Performed by: RADIOLOGY

## 2023-01-17 NOTE — TELEPHONE ENCOUNTER
Last xrays were from a gout like episode from 2021     Lets update imagine on her feet prior to the appt.     As repeat some labs again as well.   Please make sure she is on the cancellation list.   Thanks Dr. OTTO

## 2023-04-03 ENCOUNTER — OFFICE VISIT (OUTPATIENT)
Dept: RHEUMATOLOGY | Facility: CLINIC | Age: 43
End: 2023-04-03
Payer: COMMERCIAL

## 2023-04-03 VITALS
SYSTOLIC BLOOD PRESSURE: 118 MMHG | HEIGHT: 63 IN | WEIGHT: 162.81 LBS | HEART RATE: 92 BPM | DIASTOLIC BLOOD PRESSURE: 78 MMHG | BODY MASS INDEX: 28.85 KG/M2

## 2023-04-03 DIAGNOSIS — M75.32 CALCIFIC TENDINITIS OF BOTH SHOULDERS: ICD-10-CM

## 2023-04-03 DIAGNOSIS — M19.071 OSTEOARTHRITIS OF BOTH FEET, UNSPECIFIED OSTEOARTHRITIS TYPE: Primary | ICD-10-CM

## 2023-04-03 DIAGNOSIS — M75.31 CALCIFIC TENDINITIS OF BOTH SHOULDERS: ICD-10-CM

## 2023-04-03 DIAGNOSIS — M19.079 OSTEOARTHRITIS OF TALONAVICULAR JOINT: ICD-10-CM

## 2023-04-03 DIAGNOSIS — M19.072 OSTEOARTHRITIS OF BOTH FEET, UNSPECIFIED OSTEOARTHRITIS TYPE: Primary | ICD-10-CM

## 2023-04-03 PROCEDURE — 3008F PR BODY MASS INDEX (BMI) DOCUMENTED: ICD-10-PCS | Mod: CPTII,S$GLB,, | Performed by: INTERNAL MEDICINE

## 2023-04-03 PROCEDURE — 3044F HG A1C LEVEL LT 7.0%: CPT | Mod: CPTII,S$GLB,, | Performed by: INTERNAL MEDICINE

## 2023-04-03 PROCEDURE — 3078F DIAST BP <80 MM HG: CPT | Mod: CPTII,S$GLB,, | Performed by: INTERNAL MEDICINE

## 2023-04-03 PROCEDURE — 3074F SYST BP LT 130 MM HG: CPT | Mod: CPTII,S$GLB,, | Performed by: INTERNAL MEDICINE

## 2023-04-03 PROCEDURE — 1159F PR MEDICATION LIST DOCUMENTED IN MEDICAL RECORD: ICD-10-PCS | Mod: CPTII,S$GLB,, | Performed by: INTERNAL MEDICINE

## 2023-04-03 PROCEDURE — 3044F PR MOST RECENT HEMOGLOBIN A1C LEVEL <7.0%: ICD-10-PCS | Mod: CPTII,S$GLB,, | Performed by: INTERNAL MEDICINE

## 2023-04-03 PROCEDURE — 99214 PR OFFICE/OUTPT VISIT, EST, LEVL IV, 30-39 MIN: ICD-10-PCS | Mod: S$GLB,,, | Performed by: INTERNAL MEDICINE

## 2023-04-03 PROCEDURE — 3078F PR MOST RECENT DIASTOLIC BLOOD PRESSURE < 80 MM HG: ICD-10-PCS | Mod: CPTII,S$GLB,, | Performed by: INTERNAL MEDICINE

## 2023-04-03 PROCEDURE — 1159F MED LIST DOCD IN RCRD: CPT | Mod: CPTII,S$GLB,, | Performed by: INTERNAL MEDICINE

## 2023-04-03 PROCEDURE — 3008F BODY MASS INDEX DOCD: CPT | Mod: CPTII,S$GLB,, | Performed by: INTERNAL MEDICINE

## 2023-04-03 PROCEDURE — 3074F PR MOST RECENT SYSTOLIC BLOOD PRESSURE < 130 MM HG: ICD-10-PCS | Mod: CPTII,S$GLB,, | Performed by: INTERNAL MEDICINE

## 2023-04-03 PROCEDURE — 99214 OFFICE O/P EST MOD 30 MIN: CPT | Mod: S$GLB,,, | Performed by: INTERNAL MEDICINE

## 2023-04-03 PROCEDURE — 99999 PR PBB SHADOW E&M-EST. PATIENT-LVL III: ICD-10-PCS | Mod: PBBFAC,,, | Performed by: INTERNAL MEDICINE

## 2023-04-03 PROCEDURE — 99999 PR PBB SHADOW E&M-EST. PATIENT-LVL III: CPT | Mod: PBBFAC,,, | Performed by: INTERNAL MEDICINE

## 2023-04-03 ASSESSMENT — ROUTINE ASSESSMENT OF PATIENT INDEX DATA (RAPID3)
PSYCHOLOGICAL DISTRESS SCORE: 0
PAIN SCORE: 3
MDHAQ FUNCTION SCORE: 0.2
PATIENT GLOBAL ASSESSMENT SCORE: 2.5
FATIGUE SCORE: 1.1
TOTAL RAPID3 SCORE: 2.06

## 2023-04-03 NOTE — PROGRESS NOTES
Subjective:          Chief Complaint: Yumiko Montes is a 42 y.o. female who had concerns including Disease Management.    HPI:  Interval events:  Received a call from patient acute onset 24 hours ago right shoulder significant stiffness loss of range of motion no known trauma she had been on vacation during Thanksgiving without any heavy lifting or heavy exertion.    Imaging showed calcific tendon changes with  preserved glenohumeral joint some mild AC joint arthropathy.   Patient tried nabumetone with no benefit start her on Indocin 50 mg at 3:00 p.m. and again at bedtime last night with Tylenol 500 mg each dosing.    Rheum Hx:   Patient is a 40-year-old female with acute onset of possible gouty arthritis with a known strong family history of rheumatoid arthritis both in her mother and her paternal grandmother.  Patient with acute onset right knee effusion 2 weeks ago, received injection with Radu. 2-3 days later with acute onset intense right ankle/foot swelling. Would not tolerate weight bearing.   Seen in urgent care given colchicine and indomethacin she did somewhat improve over few days.   Patient is on HCTZ and spironolactone, but also on has mirena and or ortho tri-cyclin for PCOS and stable.   No hx of renal stones.     Did see JAYLA Campos-- uric acid 5.7t.   Patient denies daily arthritis. Did have flare of left wrist w/o injury spontaneously resolved 1 week with   he did have  right hip labral tear- known injury.   Patient does note knees and feet with stiffness in AM about 1 hour and improves with activity.   Hands with extended  Fine motor.     Dry mouth,     REVIEW OF SYSTEMS:    Review of Systems   Constitutional:  Negative for fever.   Eyes:  Negative for redness.   Respiratory:  Negative for cough and shortness of breath.    Cardiovascular:  Negative for chest pain.   Gastrointestinal:  Positive for diarrhea. Negative for constipation.   Genitourinary:  Negative for dysuria.   Skin:   Negative for rash.   Neurological:  Negative for headaches.   Endo/Heme/Allergies:  Does not bruise/bleed easily.             Objective:            Past Medical History:   Diagnosis Date    Abnormal ultrasound of thyroid gland 06/17/2021    Referred to Dr Banegas. Recommended Follow up in 6/2022     History of chicken pox     PCOS (polycystic ovarian syndrome)      Family History   Problem Relation Age of Onset    Other Mother         heart murmur, gastric sleeve surgery     Hypertension Father     Thyroid disease Father         has had thyroidectomy     Dupuytren's contracture Father     Heart disease Maternal Uncle     Hypertension Maternal Grandmother     Glaucoma Maternal Grandmother     Cancer Maternal Grandfather     Heart disease Maternal Grandfather     Heart disease Paternal Grandfather     Heart disease Maternal Uncle      Social History     Tobacco Use    Smoking status: Never    Smokeless tobacco: Never   Substance Use Topics    Alcohol use: Yes     Comment: 2-3 times a week     Drug use: Never         Current Outpatient Medications on File Prior to Visit   Medication Sig Dispense Refill    buPROPion (WELLBUTRIN XL) 150 MG TB24 tablet Take 1 tablet by mouth once daily.      INTRAUTERINE DEVICE, IUD, IU by Intrauterine route.      losartan-hydrochlorothiazide 100-25 mg (HYZAAR) 100-25 mg per tablet Take 1 tablet by mouth once daily. 90 tablet 3    norgestimate-ethinyl estradiol (ORTHO TRI-CYCLEN,TRI-SPRINTEC) 0.18/0.215/0.25 mg-35 mcg (28) tablet Take 1 tablet by mouth once daily.      omeprazole (PRILOSEC) 20 MG capsule Take 20 mg by mouth once daily.      semaglutide (OZEMPIC) 0.25 mg or 0.5 mg(2 mg/1.5 mL) pen injector       spironolactone (ALDACTONE) 100 MG tablet Take 1 tablet by mouth once daily.      metFORMIN (GLUCOPHAGE) 500 MG tablet Take 1 tablet (500 mg total) by mouth 2 (two) times daily with meals. 60 tablet 3     No current facility-administered medications on file prior to visit.        Vitals:    04/03/23 1640   BP: 118/78   Pulse: 92       Physical Exam:    Physical Exam          Assessment:       Encounter Diagnoses   Name Primary?    Osteoarthritis of both feet, unspecified osteoarthritis type Yes    Calcific tendinitis of both shoulders     Osteoarthritis of talonavicular joint           Plan:        Osteoarthritis of both feet, unspecified osteoarthritis type    Calcific tendinitis of both shoulders    Osteoarthritis of talonavicular joint        Patient with left ankle arthritis and talonavicular changes.   Serologies     Recurrent plantar fasciitis despite dry needling, boot, PT she does appear to have some arthritis at the talonavicular joint, and ankle arthritis.   For now strict supportive wear, bracing and HEP NSAIDs as needed.   No follow-ups on file.      30min consultation with greater than 50% spent in counseling, chart review and coordination of care. All questions answered.  Thank you for allowing me to participate in the care of this very pleasant patient.            Final draft pending.

## 2023-05-25 ENCOUNTER — OCCUPATIONAL HEALTH (OUTPATIENT)
Dept: URGENT CARE | Facility: CLINIC | Age: 43
End: 2023-05-25

## 2023-05-25 DIAGNOSIS — Z02.83 ENCOUNTER FOR DRUG SCREENING: Primary | ICD-10-CM

## 2023-05-25 LAB
CTP QC/QA: YES
POC 10 PANEL DRUG SCREEN: NEGATIVE

## 2023-05-25 PROCEDURE — 80305 POCT RAPID DRUG SCREEN 10 PANEL: ICD-10-PCS | Mod: S$GLB,,, | Performed by: PEDIATRICS

## 2023-05-25 PROCEDURE — 80305 DRUG TEST PRSMV DIR OPT OBS: CPT | Mod: S$GLB,,, | Performed by: PEDIATRICS

## 2023-07-20 ENCOUNTER — HOSPITAL ENCOUNTER (OUTPATIENT)
Dept: RADIOLOGY | Facility: HOSPITAL | Age: 43
Discharge: HOME | End: 2023-07-20
Attending: INTERNAL MEDICINE
Payer: COMMERCIAL

## 2023-07-20 DIAGNOSIS — E04.2 MULTINODULAR GOITER: ICD-10-CM

## 2023-07-20 PROCEDURE — 76536 US SOFT TISSUE HEAD NECK THYROID: ICD-10-PCS | Mod: 26,,, | Performed by: RADIOLOGY

## 2023-07-20 PROCEDURE — 76536 US EXAM OF HEAD AND NECK: CPT | Mod: TC,PO

## 2023-07-20 PROCEDURE — 76536 US EXAM OF HEAD AND NECK: CPT | Mod: 26,,, | Performed by: RADIOLOGY

## 2023-07-21 ENCOUNTER — OFFICE VISIT (OUTPATIENT)
Dept: ENDOCRINOLOGY | Facility: CLINIC | Age: 43
End: 2023-07-21
Payer: COMMERCIAL

## 2023-07-21 DIAGNOSIS — E28.2 PCOS (POLYCYSTIC OVARIAN SYNDROME): ICD-10-CM

## 2023-07-21 DIAGNOSIS — E66.01 CLASS 2 SEVERE OBESITY DUE TO EXCESS CALORIES WITH SERIOUS COMORBIDITY AND BODY MASS INDEX (BMI) OF 36.0 TO 36.9 IN ADULT: ICD-10-CM

## 2023-07-21 DIAGNOSIS — E04.2 MULTINODULAR GOITER: Primary | ICD-10-CM

## 2023-07-21 PROCEDURE — 1160F PR REVIEW ALL MEDS BY PRESCRIBER/CLIN PHARMACIST DOCUMENTED: ICD-10-PCS | Mod: CPTII,95,, | Performed by: NURSE PRACTITIONER

## 2023-07-21 PROCEDURE — 99214 OFFICE O/P EST MOD 30 MIN: CPT | Mod: 95,,, | Performed by: NURSE PRACTITIONER

## 2023-07-21 PROCEDURE — 3044F HG A1C LEVEL LT 7.0%: CPT | Mod: CPTII,95,, | Performed by: NURSE PRACTITIONER

## 2023-07-21 PROCEDURE — 1159F MED LIST DOCD IN RCRD: CPT | Mod: CPTII,95,, | Performed by: NURSE PRACTITIONER

## 2023-07-21 PROCEDURE — 99214 PR OFFICE/OUTPT VISIT, EST, LEVL IV, 30-39 MIN: ICD-10-PCS | Mod: 95,,, | Performed by: NURSE PRACTITIONER

## 2023-07-21 PROCEDURE — 1160F RVW MEDS BY RX/DR IN RCRD: CPT | Mod: CPTII,95,, | Performed by: NURSE PRACTITIONER

## 2023-07-21 PROCEDURE — 3044F PR MOST RECENT HEMOGLOBIN A1C LEVEL <7.0%: ICD-10-PCS | Mod: CPTII,95,, | Performed by: NURSE PRACTITIONER

## 2023-07-21 PROCEDURE — 1159F PR MEDICATION LIST DOCUMENTED IN MEDICAL RECORD: ICD-10-PCS | Mod: CPTII,95,, | Performed by: NURSE PRACTITIONER

## 2023-07-21 NOTE — ASSESSMENT & PLAN NOTE
Now on Metfomrin and Ozempic.   Lost 60 pounds over the last year  Unable to calculate BMI via virtual visit though it is likely she is no longer obese

## 2023-07-21 NOTE — ASSESSMENT & PLAN NOTE
Patient is clinically euthyroid.  TFTs wnls.     Overall, patient has no clinical features suggestive of  local compressive symptoms. Has some mild dysphagia. Given the size and characteristics of nodules, will continue monitor. No indication for FNA at this time. posterior margins of the inferior right thyroid lobe potentially representing an exophytic thyroid nodule versus parathyroid lesion such as a parathyroid adenoma -will check RFP PTH Vitamin D. Reassuring she has had normal calcium    Follow-up in 1 year with repeat thyroid ultrasound.

## 2023-07-21 NOTE — PROGRESS NOTES
I spoke with patient.   She had a virtual appt with endocrinology this AM.   Further testing has been ordered.

## 2023-07-21 NOTE — ASSESSMENT & PLAN NOTE
Continue care per gyn and PCP.    PCOS is a risk factor for metabolic derangements.  On OCPS.  Lifestyle changes and weight loss strategies emphasized  On metformin. Did not tolerate byetta. On ozempic as above    Continue conservative measures for hirsutism and spironolactone. Continue contraception given risk for fetal malformation if becomes pregnant while on spironolactone.

## 2023-07-21 NOTE — PROGRESS NOTES
Subjective:      Patient ID: Yumiko Montes is a 42 y.o. female.    Chief Complaint:  No chief complaint on file.    History of Present Illness  Yumiko Montes presents today for follow up of thyroid nodules. Previous patient of Dr Sandifer. Last seen in 7/2021. This is her first visit with me.     The patient location is: at home  The chief complaint leading to consultation is: thyroid nodule   Visit type: audiovisual    Face to Face time with patient: 17 minutes  30 minutes of total time spent on the encounter, which includes face to face time and non-face to face time preparing to see the patient (eg, review of tests), Obtaining and/or reviewing separately obtained history, Documenting clinical information in the electronic or other health record, Independently interpreting results (not separately reported) and communicating results to the patient/family/caregiver, or Care coordination (not separately reported).    Each patient to whom he or she provides medical services by telemedicine is:  (1) informed of the relationship between the physician and patient and the respective role of any other health care provider with respect to management of the patient; and (2) notified that he or she may decline to receive medical services by telemedicine and may withdraw from such care at any time.    With regards to thyroid nodule,     The thyroid nodule was found on exam  Last thyroid u/s: 7/2023    The patient was not aware of the thyroid nodule prior     No personal or FH of thyroid cancer or MEN syndrome.   Dad had hyperthyroidism and had HAWKINS ablation. Mom has RA. PGM had RA.     No hoarseness, voice changes.   Has issues swallowing pills for the last 2 years. Takes with apple juice which helps. Does feel like something is stuck in her throat    No difficulty breathing   No voice changes    No signs or symptoms of hyper or hypothyroidism    She has been on Ozempic for the last year - last 60 pounds; highest dose at  1.25 mg weekly and now is on 0.75 mg weekly. She has been seeing Dr. Frank as she was considering gastric surgery and scope was normal. Started ozempic 1 year ago (June 2022).    No bowel changes  No heat or cold intolerance   + hair loss  denies nail or skin changes  No cp, palpations or sob    Any blood thinners- none    7/20/2023   FINDINGS:  The thyroid is normal in size. Right lobe of the thyroid measures 4.7 x 1.2 x 1.8 cm with an estimated volume of 5.3 mL.  Left lobe of the thyroid measures 4.3 x 1.1 x 1.8 cm with an estimated volume of 4.5 mL.  Isthmus measures 0.3 cm in thickness.  Total thyroid volume measures 9.8 mL.  Thyroid parenchyma appears homogeneous.  Two solid nodules are redemonstrated within the right lobe, the largest of which appears hypoechoic within the inferior pole measuring 9 mm, stable. There is a tiny 2 mm nodule within the left thyroid lobe which appears solid and hypoechoic.  Re-demonstrated stable appearing 7 mm hypoechoic solid nodule which appears to be an exophytic nodule or outside the margins of the inferior right thyroid lobe abutting the thyroid parenchyma stable from the prior examination. A parathyroid adenoma is not excluded.  Normal thyroid perfusion.   No pathologically enlarged cervical lymph nodes.   Impression:   1. Overall stable thyroid ultrasound again demonstrating small, subcentimeter nodules none of which meet criteria for fine-needle aspiration.  2. Stable 7 mm nodule along the posterior margins of the inferior right thyroid lobe potentially representing an exophytic thyroid nodule versus parathyroid lesion such as a parathyroid adenoma.    Lab Results   Component Value Date    TSH 1.900 07/07/2023    FREET4 1.04 06/15/2021     She is not on vitamin D.      With regards to PCOS,   Managed by PCP and GYN    On Metformin and OCP -oral and IUD  She is also on spironolactone   She is now on ozempic as above     She has one child and does not want anymore children.      Review of Systems    Lab Review:   Lab Results   Component Value Date    HGBA1C 5.0 07/07/2023    HGBA1C 5.0 03/24/2023    HGBA1C 5.7 (H) 07/26/2022      Lab Results   Component Value Date    CHOL 221 (H) 07/07/2023    HDL 78 (H) 07/07/2023    LDLCALC 114.8 07/07/2023    TRIG 141 07/07/2023    CHOLHDL 35.3 07/07/2023     Lab Results   Component Value Date     (L) 07/07/2023    K 4.4 07/07/2023    CL 95 07/07/2023    CO2 30 07/07/2023    GLU 90 07/07/2023    BUN 15 07/07/2023    CREATININE 0.96 07/07/2023    CALCIUM 9.6 07/07/2023    PROT 7.5 07/07/2023    ALBUMIN 4.4 07/07/2023    BILITOT 0.5 07/07/2023    ALKPHOS 71 07/07/2023    AST 21 07/07/2023    ALT 20 07/07/2023    ANIONGAP 8 07/07/2023    ESTGFRAFRICA >60 07/26/2022    EGFRNONAA >60 07/26/2022    TSH 1.900 07/07/2023     No results found for: [id number]  Assessment and Plan     1. Multinodular goiter  Vitamin D    PTH, Intact    Renal Function Panel      2. Class 2 severe obesity due to excess calories with serious comorbidity and body mass index (BMI) of 36.0 to 36.9 in adult        3. PCOS (polycystic ovarian syndrome)            Multinodular goiter  Patient is clinically euthyroid.  TFTs wnls.     Overall, patient has no clinical features suggestive of  local compressive symptoms. Has some mild dysphagia. Given the size and characteristics of nodules, will continue monitor. No indication for FNA at this time. posterior margins of the inferior right thyroid lobe potentially representing an exophytic thyroid nodule versus parathyroid lesion such as a parathyroid adenoma -will check RFP PTH Vitamin D. Reassuring she has had normal calcium    Follow-up in 1 year with repeat thyroid ultrasound.    Class 2 severe obesity due to excess calories with serious comorbidity and body mass index (BMI) of 36.0 to 36.9 in adult  Now on Metfomrin and Ozempic.   Lost 60 pounds over the last year  Unable to calculate BMI via virtual visit though it is likely she  is no longer obese    PCOS (polycystic ovarian syndrome)  Continue care per gyn and PCP.    PCOS is a risk factor for metabolic derangements.  On OCPS.  Lifestyle changes and weight loss strategies emphasized  On metformin. Did not tolerate byetta. On ozempic as above    Continue conservative measures for hirsutism and spironolactone. Continue contraception given risk for fetal malformation if becomes pregnant while on spironolactone.    Follow up in about 1 year (around 7/21/2024).